# Patient Record
Sex: FEMALE
[De-identification: names, ages, dates, MRNs, and addresses within clinical notes are randomized per-mention and may not be internally consistent; named-entity substitution may affect disease eponyms.]

---

## 2018-03-15 ENCOUNTER — HOSPITAL ENCOUNTER (OUTPATIENT)
Dept: HOSPITAL 5 - OR | Age: 33
Discharge: HOME | End: 2018-03-15
Attending: ORTHOPAEDIC SURGERY
Payer: COMMERCIAL

## 2018-03-15 VITALS — DIASTOLIC BLOOD PRESSURE: 59 MMHG | SYSTOLIC BLOOD PRESSURE: 115 MMHG
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
## 2018-03-15 DIAGNOSIS — M25.851: ICD-10-CM
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
## 2018-03-15 DIAGNOSIS — S73.191A: Primary | ICD-10-CM
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
## 2018-03-15 PROCEDURE — 86901 BLOOD TYPING SEROLOGIC RH(D): CPT

## 2018-03-15 PROCEDURE — 86900 BLOOD TYPING SEROLOGIC ABO: CPT

## 2018-03-15 PROCEDURE — 29916 HIP ARTHRO W/LABRAL REPAIR: CPT

## 2018-03-15 PROCEDURE — 73501 X-RAY EXAM HIP UNI 1 VIEW: CPT

## 2018-03-15 PROCEDURE — 86850 RBC ANTIBODY SCREEN: CPT

## 2018-03-15 PROCEDURE — 97161 PT EVAL LOW COMPLEX 20 MIN: CPT

## 2018-03-15 PROCEDURE — 81025 URINE PREGNANCY TEST: CPT

## 2018-03-15 PROCEDURE — C1713 ANCHOR/SCREW BN/BN,TIS/BN: HCPCS

## 2018-03-15 NOTE — ANESTHESIA CONSULTATION
Anesthesia Consult and Med Hx


Date of service: 03/15/18





- Airway


Anesthetic Teeth Evaluation: Good


ROM Head & Neck: Adequate


Mental/Hyoid Distance: Adequate


Mallampati Class: Class I


Intubation Access Assessment: Good





- Pulmonary Exam


CTA: Yes





- Cardiac Exam


Cardiac Exam: RRR





- Pre-Operative Health Status


ASA Pre-Surgery Classification: ASA2


Proposed Anesthetic Plan: General





- Pulmonary


Hx Smoking: No


Hx Sleep Apnea: No (JEANNE PRE SCREEN NEGATIVE)





- Cardiovascular System


Hx Hypertension: No





- Central Nervous System


Hx Back Pain: Yes





- Hematic


Hx Anemia: Yes (NOT RECENT)





- Other Systems


Hx Cancer: No

## 2018-03-16 NOTE — XRAY REPORT
Operative right hip:



Several images are obtained during surgery demonstrating operative 

hardware used for labral tear repair. No bone changes are identified.

## 2018-04-19 NOTE — OPERATIVE REPORT
PREOPERATIVE DIAGNOSES:

1.  Acetabular labral tear anterior and superior.

2.  Femoroacetabular impingement.

3.  Degenerative joint disease of the right hip joint.

4.  Soft tissue hypertrophy, right hip joint.



POSTOPERATIVE DIAGNOSES:

1.  Acetabular labral tear anterior and superior.

2.  Femoroacetabular impingement.

3.  Degenerative joint disease of the right hip joint.

4.  Soft tissue hypertrophy, right hip joint.



COMPLICATIONS:  None.



BLOOD LOSS:  Minimal.



SURGEON:  Dany Hamilton M.D.



ASSISTANT:  Michael Kulkarni, operative tech.



PROCEDURES PERFORMED:

1.  Right hip arthroscopy.

2.  Arthroscopic labral repair.

3.  Arthroscopic rim resection and removal of the impinging bone, femoral side

as well.



BRIEF HISTORY:  The patient had a painful right hip, failed conservative

treatment, opted to go for surgical intervention.  Risks, benefit discussed,

informed consent obtained, brought to the hospital for the above procedure.



DETAILS OF THE OPERATIVE REPORT:  The patient was taken to the operating room. 

After smooth general endotracheal anesthesia, all the bony prominences were

carefully padded, placed supine on the table and right lower extremities were

secured into the [employer] traction table attachment.  All the bony

prominences were carefully padded.  The big perineal post was used to more

abutting to the medial thigh.  Right hip and right lower extremity prepped and

draped in a sterile fashion.  Antibiotic given 2 g Ancef half an hour before the

procedure.  Fluoroscopy and arthroscopy was used throughout the procedure. 

Necessary anatomical landmarks were drawn.  Anterolateral portal was marked

including the anterior trochanteric portal and posterior trochanteric portal was

marked including the anterior portal and the mid anterior portal was marked. 

Anatomical landmarks were drawn out.  We stayed completely lateral to the line

drawn perpendicular from the ASIS.  I stayed lateral to it.  Fluoroscopic

guidance were used to make necessary portal.  A central spinal needle was used

to insert this into the right hip joint under fluoroscopic guidance and the

joint was insufflated with 30 mL of normal saline.  It was insufflated

previously.  Before this was performed and before the prepping and draping was

performed, gross traction was done and we noticed a vacuum sign as well.  After

the sterile prep and drape, the spinal needle was inserted under fluoroscopic

guidance and joint insufflated with 30 mL normal saline, mild traction was

performed.  Following this, the nitinol guidewire was placed and confirmed its

position in the right hip joint and to the AP and lateral view.  We decided to

go with this, then #11 blade was used to make the portal.  Next, three dilators

were used to make the anterior trochanteric portal and anterolateral portal was

created.  The 5-5 cannula was then inserted.  We saw an anterior capsule

triangle.  Under fluoroscopic guidance, the anterior portal was created using

the cross tract and we were able to enter into the right hip joint, second

portal was then created under direct visualization.  Once the spinal needle was

placed in the proper position, the nitinol guidewire was then placed through it

followed by the sequential dilators to insert a 5.5 cannula.  Once this was

confirmed, fluid was started and banana blade was then placed to perform

necessary capsulotomy on the anterior end and anterolateral portal by switching

the portals.  Once this was done with emily and debridement of the thick

hypertrophic tissue, which was encountered into the hip was performed.  This was

followed by getting access into the superolateral acetabular area where the

thick hypertrophic tissue and impinging bone, which rim resection was performed.

 We noticed some fraying of the labral tissue, which was debrided off.  There

was a labral tear, which was identified with some delamination for which repair

was performed.  After the necessary debridement of the soft tissue and after

impinging soft tissue and rim resection was performed.  The FiberWire type of

stitch was then placed through [place] through the labrum capturing the whole

labrum and prepared for by Bioraptor anchor 2.9 into the superolateral area.  On

the anterior cannula, we had great access.  Before the knotless anchor was

placed, the larger cannula was inserted for better suture management.  A good

repair of the labrum was performed and we had excellent reconstruction of the

labrum.  Once necessary rim resection of the labral repair and debridement of

the soft tissue was performed, traction was slowly released and the knee was

flexed and we got access into the peripheral compartment where the significant

bony hump was encountered on the superolateral aspect just distal to the femoral

head and the third was used to find a necessary demarcation and emily and

minimal bur was used to do the Cam resection.  Care was taken to preserve all

the retinacular vessels.  We were nowhere near close to it.  No active bleeding

was seen.  Once necessary loose tissue and thick hypertrophic tissue, which was

also seen.  Anterosuperior neck was debrided off as well as using the ablation

and shaver.  After the necessary resection was performed, the shaver was used to

clean all the debris.  The patient tolerated the procedure well.  The suction

and the instruments were removed from the portals.  Portal sites were closed

with 3-0 nylon interrupted sutures.  Dressing was done with Xeroform, 4 x 4s,

ABD, and paper tape.  The patient tolerated procedure well, shifted to recovery

room in stable condition.  Sponge and needle count was correct.





DD: 04/19/2018 19:30

DT: 04/19/2018 22:43

JOB# [id number]  [id number]

SK/NTS